# Patient Record
Sex: MALE | Race: WHITE | NOT HISPANIC OR LATINO
[De-identification: names, ages, dates, MRNs, and addresses within clinical notes are randomized per-mention and may not be internally consistent; named-entity substitution may affect disease eponyms.]

---

## 2021-11-18 PROBLEM — Z00.00 ENCOUNTER FOR PREVENTIVE HEALTH EXAMINATION: Status: ACTIVE | Noted: 2021-11-18

## 2021-11-29 ENCOUNTER — APPOINTMENT (OUTPATIENT)
Dept: CARDIOLOGY | Facility: CLINIC | Age: 66
End: 2021-11-29
Payer: MEDICARE

## 2021-11-29 VITALS
HEIGHT: 69 IN | BODY MASS INDEX: 28.14 KG/M2 | WEIGHT: 190 LBS | HEART RATE: 58 BPM | SYSTOLIC BLOOD PRESSURE: 128 MMHG | DIASTOLIC BLOOD PRESSURE: 86 MMHG

## 2021-11-29 DIAGNOSIS — Z87.891 PERSONAL HISTORY OF NICOTINE DEPENDENCE: ICD-10-CM

## 2021-11-29 DIAGNOSIS — Z78.9 OTHER SPECIFIED HEALTH STATUS: ICD-10-CM

## 2021-11-29 DIAGNOSIS — Z82.49 FAMILY HISTORY OF ISCHEMIC HEART DISEASE AND OTHER DISEASES OF THE CIRCULATORY SYSTEM: ICD-10-CM

## 2021-11-29 PROCEDURE — 93000 ELECTROCARDIOGRAM COMPLETE: CPT

## 2021-11-29 PROCEDURE — 99205 OFFICE O/P NEW HI 60 MIN: CPT

## 2021-12-30 ENCOUNTER — OUTPATIENT (OUTPATIENT)
Dept: OUTPATIENT SERVICES | Facility: HOSPITAL | Age: 66
LOS: 1 days | Discharge: HOME | End: 2021-12-30
Payer: MEDICARE

## 2021-12-30 ENCOUNTER — RESULT REVIEW (OUTPATIENT)
Age: 66
End: 2021-12-30

## 2021-12-30 VITALS
WEIGHT: 190.04 LBS | HEIGHT: 69 IN | TEMPERATURE: 98 F | SYSTOLIC BLOOD PRESSURE: 150 MMHG | HEART RATE: 64 BPM | DIASTOLIC BLOOD PRESSURE: 86 MMHG | OXYGEN SATURATION: 97 % | RESPIRATION RATE: 16 BRPM

## 2021-12-30 DIAGNOSIS — Z01.818 ENCOUNTER FOR OTHER PREPROCEDURAL EXAMINATION: ICD-10-CM

## 2021-12-30 DIAGNOSIS — I48.0 PAROXYSMAL ATRIAL FIBRILLATION: ICD-10-CM

## 2021-12-30 DIAGNOSIS — Z98.890 OTHER SPECIFIED POSTPROCEDURAL STATES: Chronic | ICD-10-CM

## 2021-12-30 DIAGNOSIS — Z98.52 VASECTOMY STATUS: Chronic | ICD-10-CM

## 2021-12-30 LAB
ALBUMIN SERPL ELPH-MCNC: 4.6 G/DL — SIGNIFICANT CHANGE UP (ref 3.5–5.2)
ALP SERPL-CCNC: 69 U/L — SIGNIFICANT CHANGE UP (ref 30–115)
ALT FLD-CCNC: 23 U/L — SIGNIFICANT CHANGE UP (ref 0–41)
ANION GAP SERPL CALC-SCNC: 15 MMOL/L — HIGH (ref 7–14)
APTT BLD: 37.6 SEC — SIGNIFICANT CHANGE UP (ref 27–39.2)
AST SERPL-CCNC: 20 U/L — SIGNIFICANT CHANGE UP (ref 0–41)
BILIRUB SERPL-MCNC: 0.8 MG/DL — SIGNIFICANT CHANGE UP (ref 0.2–1.2)
BUN SERPL-MCNC: 19 MG/DL — SIGNIFICANT CHANGE UP (ref 10–20)
CALCIUM SERPL-MCNC: 9.1 MG/DL — SIGNIFICANT CHANGE UP (ref 8.5–10.1)
CHLORIDE SERPL-SCNC: 106 MMOL/L — SIGNIFICANT CHANGE UP (ref 98–110)
CO2 SERPL-SCNC: 21 MMOL/L — SIGNIFICANT CHANGE UP (ref 17–32)
CREAT SERPL-MCNC: 1.1 MG/DL — SIGNIFICANT CHANGE UP (ref 0.7–1.5)
GLUCOSE SERPL-MCNC: 99 MG/DL — SIGNIFICANT CHANGE UP (ref 70–99)
INR BLD: 1.11 RATIO — SIGNIFICANT CHANGE UP (ref 0.65–1.3)
POTASSIUM SERPL-MCNC: 3.6 MMOL/L — SIGNIFICANT CHANGE UP (ref 3.5–5)
POTASSIUM SERPL-SCNC: 3.6 MMOL/L — SIGNIFICANT CHANGE UP (ref 3.5–5)
PROT SERPL-MCNC: 6.7 G/DL — SIGNIFICANT CHANGE UP (ref 6–8)
PROTHROM AB SERPL-ACNC: 12.7 SEC — SIGNIFICANT CHANGE UP (ref 9.95–12.87)
SODIUM SERPL-SCNC: 142 MMOL/L — SIGNIFICANT CHANGE UP (ref 135–146)

## 2021-12-30 PROCEDURE — 71046 X-RAY EXAM CHEST 2 VIEWS: CPT | Mod: 26

## 2021-12-30 PROCEDURE — 93010 ELECTROCARDIOGRAM REPORT: CPT

## 2021-12-30 RX ORDER — APIXABAN 2.5 MG/1
1 TABLET, FILM COATED ORAL
Qty: 0 | Refills: 0 | DISCHARGE

## 2021-12-30 RX ORDER — CANDESARTAN CILEXETIL AND HYDROCHLOROTHIAZIDE 32; 12.5 MG/1; MG/1
1 TABLET ORAL
Qty: 0 | Refills: 0 | DISCHARGE

## 2021-12-30 RX ORDER — METOPROLOL TARTRATE 50 MG
1 TABLET ORAL
Qty: 0 | Refills: 0 | DISCHARGE

## 2021-12-30 RX ORDER — CLOPIDOGREL BISULFATE 75 MG/1
1 TABLET, FILM COATED ORAL
Qty: 0 | Refills: 0 | DISCHARGE

## 2021-12-30 NOTE — H&P PST ADULT - NSICDXPASTMEDICALHX_GEN_ALL_CORE_FT
PAST MEDICAL HISTORY:  Afib     CAD (coronary artery disease)     HTN (hypertension)     Hypercholesteremia

## 2021-12-30 NOTE — H&P PST ADULT - HISTORY OF PRESENT ILLNESS
66 YR old male states was admitted to a hospital in [place] 10/2021 -for rapid afib- is scheduled for AF/ AFL ABALTION/ ALEXANDRA/ CRYO. Denies COVID S/S. Recd vaccine. Verbalized understanding of COVID prevention  measures. Exercise guillermo 2FOS walks 3miles -40mts -3-4 times/week.  Anesthesia Alert  YES--Difficult Airway  NO--History of neck surgery or radiation  NO--Limited ROM of neck  NO--History of Malignant hyperthermia  NO--Personal or family history of Pseudocholinesterase deficiency  NO--Prior Anesthesia Complication  NO--Latex Allergy  NO--Loose teeth  NO--History of Rheumatoid Arthritis  NO--SOMMER  No Bleeding risk  NO--Other_____

## 2021-12-30 NOTE — H&P PST ADULT - NSANTHOSAYNRD_GEN_A_CORE
No. SOMMER screening performed.  STOP BANG Legend: 0-2 = LOW Risk; 3-4 = INTERMEDIATE Risk; 5-8 = HIGH Risk

## 2021-12-30 NOTE — H&P PST ADULT - NSICDXPASTSURGICALHX_GEN_ALL_CORE_FT
PAST SURGICAL HISTORY:  H/O vasectomy RT knee arthroscopy    History of left heart catheterization (LHC) stent-7/2021

## 2022-01-08 NOTE — CARDIOLOGY SUMMARY
[de-identified] : (11/29/2021) / ECG: sinus bradycardia at 58 bpm, no significant ST/T abnormalities [de-identified] : (10/02/2021) / TTE: EF 62%, mild AI, moderate MR [de-identified] : (7/7/2021) / Cath PCI report: LAD 50%, Cx 50%, mid RCA 80%, 4.5x 16 mm synergy stent

## 2022-01-08 NOTE — HISTORY OF PRESENT ILLNESS
[FreeTextEntry1] : Referring Cardiologist: Dr. Emily Arrieta\par Referring Internist: Dr. Sabi Pierre\par \par CAD s/p stent in July 2021; hypertension, hyperlipidemia\par Atrial Fibrillation 10/28/2021 while in [place]\par He woke up with palpitations and SOB; called ambulance; started on Amiodarone and Eliquis;\par EMS original assessment was SVT; on arrival to ER he was in AF.\par He is physically active: biking outside; treadmill; light weights\par 1.5 hours 4 days a week\par He has no chest pain, no shortness of breath, no dyspnea on exertion, no orthopnea, no PND. He denies dizziness, lightheadedness and syncope. He has no exertional symptoms. He presents for evaluation.\par \par

## 2022-01-08 NOTE — DISCUSSION/SUMMARY
[FreeTextEntry1] : Mr. Russ Schultz is a pleasant 66 year-old man with CAD s/p stent in July 2021, hypertension, hyperlipidemia, and cardiac arrhythmias. He was diagnosed with Atrial Fibrillation on 10/28/2021 while in [place]. He woke up with palpitations and SOB; called ambulance; give Amiodarone and Eliquis;\par EMS original assessment was SVT; on arrival to ER he was in AF. Review of ECGs shows Atrial Flutter/Atrial Fibrillation. He is physically active: biking outside; treadmill; light weights -1.5 hours 4 days a week- His CHADSVASC score is 3 (Age, CAD, HTN). He is on Eliquis. \par \par I recommend to continue Eliquis, and Metoprolol. \par \par Use of betablocker/CCB/antiarrhythmics limited by resting bradycardia. \par \par I discussed hydration and avoiding ETOH.\par \par I recommend evaluation for SOMMER.\par \par The management strategies for atrial fibrillation were discussed focusing on the issues of stroke prevention, heart rate control and rhythm control. The options of rate control, antiarrhythmic drug therapy, and electrophysiologic testing and catheter ablation therapy were discussed at length. As he is not keen on long term antiarrhythmic medication, he is a good candidate for catheter ablation of atrial fibrillation in the form of pulmonary vein isolation. I have discussed the procedure with him in great detail. He was told this procedure is performed under anesthesia with the duration of about four hours. Possible complications include but not limited to bleeding, vascular injury, groin complications, cardiac tamponade, stroke, esophageal injury, pulmonary vein stenosis, need for pacemaker, need for cardiac surgery, and rare risks of esophageal fistula/stroke/heart attack/death. Intracardiac echo is used to monitor the procedure. In addition, we use a temperature probe in the esophagus to prevent lesions. The success rate is in the range 70-80%. About 20% of patients require a second procedure for pulmonary vein reconnection. \par \par Patient expressed he will contact my office if he wants to proceed with ablation. If he agrees, I will plan CryoPVI, RF-CTI, and SVT induction. \par \par He verbalized understanding of the discussion and all questions were addressed and answered. Patient will follow with me in 2 months’ time. Please do not hesitate to contact me at [phone] if you have any further questions regarding this patient care.\par \par

## 2022-01-08 NOTE — REASON FOR VISIT
[Arrhythmia/ECG Abnorrmalities] : arrhythmia/ECG abnormalities [FreeTextEntry3] : Dr. Emily Arrieta - Dr. Sabi Pierre

## 2022-01-09 ENCOUNTER — LABORATORY RESULT (OUTPATIENT)
Age: 67
End: 2022-01-09

## 2022-01-12 ENCOUNTER — INPATIENT (INPATIENT)
Facility: HOSPITAL | Age: 67
LOS: 0 days | Discharge: HOME | End: 2022-01-13
Attending: STUDENT IN AN ORGANIZED HEALTH CARE EDUCATION/TRAINING PROGRAM | Admitting: STUDENT IN AN ORGANIZED HEALTH CARE EDUCATION/TRAINING PROGRAM
Payer: MEDICARE

## 2022-01-12 VITALS
HEART RATE: 59 BPM | OXYGEN SATURATION: 99 % | SYSTOLIC BLOOD PRESSURE: 140 MMHG | HEIGHT: 69 IN | TEMPERATURE: 98 F | WEIGHT: 190.04 LBS | DIASTOLIC BLOOD PRESSURE: 86 MMHG | RESPIRATION RATE: 16 BRPM

## 2022-01-12 DIAGNOSIS — Z98.890 OTHER SPECIFIED POSTPROCEDURAL STATES: Chronic | ICD-10-CM

## 2022-01-12 DIAGNOSIS — I48.0 PAROXYSMAL ATRIAL FIBRILLATION: ICD-10-CM

## 2022-01-12 DIAGNOSIS — Z98.52 VASECTOMY STATUS: Chronic | ICD-10-CM

## 2022-01-12 LAB
ALBUMIN SERPL ELPH-MCNC: 4 G/DL — SIGNIFICANT CHANGE UP (ref 3.5–5.2)
ALP SERPL-CCNC: 51 U/L — SIGNIFICANT CHANGE UP (ref 30–115)
ALT FLD-CCNC: 15 U/L — SIGNIFICANT CHANGE UP (ref 0–41)
ANION GAP SERPL CALC-SCNC: 12 MMOL/L — SIGNIFICANT CHANGE UP (ref 7–14)
AST SERPL-CCNC: 16 U/L — SIGNIFICANT CHANGE UP (ref 0–41)
BILIRUB SERPL-MCNC: 0.9 MG/DL — SIGNIFICANT CHANGE UP (ref 0.2–1.2)
BLD GP AB SCN SERPL QL: SIGNIFICANT CHANGE UP
BUN SERPL-MCNC: 17 MG/DL — SIGNIFICANT CHANGE UP (ref 10–20)
CALCIUM SERPL-MCNC: 8.3 MG/DL — LOW (ref 8.5–10.1)
CHLORIDE SERPL-SCNC: 107 MMOL/L — SIGNIFICANT CHANGE UP (ref 98–110)
CK MB CFR SERPL CALC: 2.3 NG/ML — SIGNIFICANT CHANGE UP (ref 0.6–6.3)
CK SERPL-CCNC: 89 U/L — SIGNIFICANT CHANGE UP (ref 0–225)
CO2 SERPL-SCNC: 22 MMOL/L — SIGNIFICANT CHANGE UP (ref 17–32)
CREAT SERPL-MCNC: 1.1 MG/DL — SIGNIFICANT CHANGE UP (ref 0.7–1.5)
GLUCOSE SERPL-MCNC: 121 MG/DL — HIGH (ref 70–99)
HCT VFR BLD CALC: 37.4 % — LOW (ref 42–52)
HCT VFR BLD CALC: 41.9 % — LOW (ref 42–52)
HGB BLD-MCNC: 13.1 G/DL — LOW (ref 14–18)
HGB BLD-MCNC: 14.6 G/DL — SIGNIFICANT CHANGE UP (ref 14–18)
INR BLD: 1.15 RATIO — SIGNIFICANT CHANGE UP (ref 0.65–1.3)
MAGNESIUM SERPL-MCNC: 1.8 MG/DL — SIGNIFICANT CHANGE UP (ref 1.8–2.4)
MCHC RBC-ENTMCNC: 30.1 PG — SIGNIFICANT CHANGE UP (ref 27–31)
MCHC RBC-ENTMCNC: 30.2 PG — SIGNIFICANT CHANGE UP (ref 27–31)
MCHC RBC-ENTMCNC: 34.8 G/DL — SIGNIFICANT CHANGE UP (ref 32–37)
MCHC RBC-ENTMCNC: 35 G/DL — SIGNIFICANT CHANGE UP (ref 32–37)
MCV RBC AUTO: 86.2 FL — SIGNIFICANT CHANGE UP (ref 80–94)
MCV RBC AUTO: 86.4 FL — SIGNIFICANT CHANGE UP (ref 80–94)
NRBC # BLD: 0 /100 WBCS — SIGNIFICANT CHANGE UP (ref 0–0)
NRBC # BLD: 0 /100 WBCS — SIGNIFICANT CHANGE UP (ref 0–0)
NT-PROBNP SERPL-SCNC: 54 PG/ML — SIGNIFICANT CHANGE UP (ref 0–300)
PLATELET # BLD AUTO: 150 K/UL — SIGNIFICANT CHANGE UP (ref 130–400)
PLATELET # BLD AUTO: 165 K/UL — SIGNIFICANT CHANGE UP (ref 130–400)
POTASSIUM SERPL-MCNC: 3.7 MMOL/L — SIGNIFICANT CHANGE UP (ref 3.5–5)
POTASSIUM SERPL-SCNC: 3.7 MMOL/L — SIGNIFICANT CHANGE UP (ref 3.5–5)
PROT SERPL-MCNC: 5.7 G/DL — LOW (ref 6–8)
PROTHROM AB SERPL-ACNC: 13.2 SEC — HIGH (ref 9.95–12.87)
RBC # BLD: 4.34 M/UL — LOW (ref 4.7–6.1)
RBC # BLD: 4.85 M/UL — SIGNIFICANT CHANGE UP (ref 4.7–6.1)
RBC # FLD: 12.1 % — SIGNIFICANT CHANGE UP (ref 11.5–14.5)
RBC # FLD: 12.2 % — SIGNIFICANT CHANGE UP (ref 11.5–14.5)
SODIUM SERPL-SCNC: 141 MMOL/L — SIGNIFICANT CHANGE UP (ref 135–146)
TROPONIN T SERPL-MCNC: <0.01 NG/ML — SIGNIFICANT CHANGE UP
WBC # BLD: 5.21 K/UL — SIGNIFICANT CHANGE UP (ref 4.8–10.8)
WBC # BLD: 5.71 K/UL — SIGNIFICANT CHANGE UP (ref 4.8–10.8)
WBC # FLD AUTO: 5.21 K/UL — SIGNIFICANT CHANGE UP (ref 4.8–10.8)
WBC # FLD AUTO: 5.71 K/UL — SIGNIFICANT CHANGE UP (ref 4.8–10.8)

## 2022-01-12 PROCEDURE — 93306 TTE W/DOPPLER COMPLETE: CPT | Mod: 26

## 2022-01-12 PROCEDURE — 93312 ECHO TRANSESOPHAGEAL: CPT | Mod: 26

## 2022-01-12 PROCEDURE — 93620 COMP EP EVL R AT VEN PAC&REC: CPT | Mod: 26

## 2022-01-12 PROCEDURE — 93621 COMP EP EVL L PAC&REC C SINS: CPT | Mod: 26

## 2022-01-12 PROCEDURE — 93325 DOPPLER ECHO COLOR FLOW MAPG: CPT | Mod: 26

## 2022-01-12 PROCEDURE — 93320 DOPPLER ECHO COMPLETE: CPT | Mod: 26

## 2022-01-12 PROCEDURE — 93623 PRGRMD STIMJ&PACG IV RX NFS: CPT | Mod: 26

## 2022-01-12 RX ORDER — ATORVASTATIN CALCIUM 80 MG/1
40 TABLET, FILM COATED ORAL AT BEDTIME
Refills: 0 | Status: DISCONTINUED | OUTPATIENT
Start: 2022-01-12 | End: 2022-01-13

## 2022-01-12 RX ORDER — PANTOPRAZOLE SODIUM 20 MG/1
40 TABLET, DELAYED RELEASE ORAL DAILY
Refills: 0 | Status: DISCONTINUED | OUTPATIENT
Start: 2022-01-12 | End: 2022-01-12

## 2022-01-12 RX ORDER — SODIUM CHLORIDE 9 MG/ML
1000 INJECTION INTRAMUSCULAR; INTRAVENOUS; SUBCUTANEOUS
Refills: 0 | Status: DISCONTINUED | OUTPATIENT
Start: 2022-01-12 | End: 2022-01-13

## 2022-01-12 RX ORDER — CLOPIDOGREL BISULFATE 75 MG/1
75 TABLET, FILM COATED ORAL DAILY
Refills: 0 | Status: DISCONTINUED | OUTPATIENT
Start: 2022-01-12 | End: 2022-01-13

## 2022-01-12 RX ORDER — INFLUENZA VIRUS VACCINE 15; 15; 15; 15 UG/.5ML; UG/.5ML; UG/.5ML; UG/.5ML
0.7 SUSPENSION INTRAMUSCULAR ONCE
Refills: 0 | Status: DISCONTINUED | OUTPATIENT
Start: 2022-01-12 | End: 2022-01-13

## 2022-01-12 RX ORDER — METOPROLOL TARTRATE 50 MG
25 TABLET ORAL DAILY
Refills: 0 | Status: DISCONTINUED | OUTPATIENT
Start: 2022-01-12 | End: 2022-01-13

## 2022-01-12 RX ORDER — APIXABAN 2.5 MG/1
5 TABLET, FILM COATED ORAL
Refills: 0 | Status: DISCONTINUED | OUTPATIENT
Start: 2022-01-12 | End: 2022-01-12

## 2022-01-12 RX ORDER — HYDROCHLOROTHIAZIDE 25 MG
12.5 TABLET ORAL DAILY
Refills: 0 | Status: DISCONTINUED | OUTPATIENT
Start: 2022-01-12 | End: 2022-01-13

## 2022-01-12 NOTE — CONSULT NOTE ADULT - ASSESSMENT
Acute coronary syndrome during EP study  - given [plavix today  - continue statin  - will perform cardiac catheterization this afternoon to rule out progression of CAD

## 2022-01-12 NOTE — CHART NOTE - NSCHARTNOTEFT_GEN_A_CORE
I saw and examined patient and I reviewed his chart and blood work. I attest that there has been no clinical change in patient's condition since last assessment documented in H&P, consult, or last office visit.
PRE-OP DIAGNOSIS: ST elevation during EP study after isopryl infusion    PROCEDURE:   [x] Coronary Angiogram     [x] LHC     [] LVG     [] RHC     [x] Intervention (see below)         PHYSICIAN: Dr. Gibson    FELLOW: Dr. Martinez, Dr. Reddy, Dr. Garcia    PROCEDURE DESCRIPTION:     Consent:      [X] Patient     [] Family Member     []  Used        Anesthesia:     [] General     [X] Sedation     [] Local        Access & Closure:     [x] 6Fr Radial Artery --> D-STAT    [] Fr Femoral Artery     [] Fr Femoral Vein     [] Fr Brachial Vein       IV Contrast: 50       Intervention: iFR  LAD 0.92  D1 0.94  D2 0.93    FINDINGS:   Coronary Dominance: right    LM: mild luminal irregularities    LAD: prox LAD 50% stenosis discrete, iFR 0.92   Diag: D1 60% stenosis iFR 0.94  D2 70% prox lesion iFR 0.93    CX: mild to moderate diffuse atherosclerotic disease   OM1: small vessel, mild disease  OM2: large sized, mild luminal irregularities    RCA: patent prox stent   RPDA: no disease    LVEDP: mildly elevated     ESTIMATED BLOOD LOSS: < 10 mL     CONDITION:     [X] Good     [] Fair     [] Critical      POST-OP DIAGNOSIS:      [x] Non-obstructive CAD     PLAN OF CARE:     [x] Return to In-patient bed    [x] Aggressive medical management    [x] IV Fluids: NS at 100cc/h x 6h    Derrell Garcia PGY4
Electrophysiology Brief Post-Op Note      I have personally seen and examined the patient.  I agree with the history and physical which I have reviewed and noted any changes below.     PRE-OP DIAGNOSIS: SVT-Atrial Flutter-Atrial Fibrillation    POST-OP DIAGNOSIS: SVT-Atrial Flutter-Atrial Fibrillation    PROCEDURE:  -Electrophysiology study with induction of arrhythmias  -Infusion of medicine  -ALEXANDRA    Vascular Access used (using Ultrasound Guidance and micropuncture needle)  -Right Femoral Vein: 8F  5F  -Left Femoral Vein: 11F 8F 6F  -Right Femoral Artery: none    All sheaths and wires removed, Perclose sutures applied to 11 and 8, and manual pressure applied.    Physician: Galina  Assistant: None    ANESTHESIA TYPE:  [X]General Anesthesia  [  ] Sedation  [x] Local/Regional    CONDITION  [  ] Critical  [  ] Serious  [  ]Fair  [X]Good    SPECIMENS REMOVED (IF APPLICABLE): NONE  All wires were removed    IMPLANTS (IF APPLICABLE): NONE    FINDINGS (see below)  -NO inducible SVT  -No immediate complications  -ESTIMATED BLOOD LOSS:  15 mL  -ST segment depression noted in inferior and lateral leads during Programmed Atrial Stimulation, on Isuprel 3 mcg; Isuprel stopped  ST segments normalized within 3 minutes;   -Procedure stopped - Interventional team called - Dr. Gibson - Case discussed with Dr. Arrieta over phone.   -All sheaths removed - general anesthesia reversed - patient had no symptoms - St segments remained normal  -Plan for transfer to cath lab for coronary angiogram  -I called daughter Lissett twice and updated her  -I called Dr. Nayak and updated her      PLAN OF CARE  -	CCU   -             Cardiac Cath today   -	Bed rest for 4 hours  -             Was on Plavix/Eliquis; Antiplatelets/DOACs will depend on coronary intervention

## 2022-01-12 NOTE — PACU DISCHARGE NOTE - COMMENTS
S/P ALEXANDRA   EP STUDY  ABORTED ABLATION BY CARDIOLOGIST SECONDARY TO TEMPORARY ST DEPRESSION DURING THE STUDY WHICH RESOLVED IMMEDIATELY ONCE INDUCED TACHYCARDIA STOPPED ,DR MEREDITH INFORMED IMMEDIATELY ONCE ST DEPRESSION NOTED .AFTER DISCUSSIONS W CARDIOLOGIST ,CASE ABORTED FOR CARDIAC CATH TO BE PERFORMED .  PT STABLE THROUGHOUT THE PROCEDURE

## 2022-01-12 NOTE — CONSULT NOTE ADULT - SUBJECTIVE AND OBJECTIVE BOX
Date of Admission: 1/12/22    CHIEF COMPLAINT: EP study    HISTORY OF PRESENT ILLNESS: 66yMale with PMH below presented to the hospital for an EP study for SVT but had St changes consistent with ischemia during and after isopryl infusion. STs went down, patient take off the table and was referred for coronary angiography. Patient has a history of CAD with recent PCI in July 2021 with Dr. Arrieta. Patient intubated and sedated so collateral information unavailable.     PAST MEDICAL & SURGICAL HISTORY:  HTN (hypertension)    Afib    Hypercholesteremia    CAD (coronary artery disease)    History of left heart catheterization (LHC)  stent-7/2021    H/O vasectomy  RT knee arthroscopy        FAMILY HISTORY:  [ ] no pertinent family history of premature cardiovascular disease in first degree relatives.  Mother:   Father:   Siblings:     SOCIAL HISTORY:    [ ] Non-smoker  [ ] Smoker  [ ] Alcohol    Allergies    No Known Allergies    Intolerances    	    REVIEW OF SYSTEMS:  unable to obtain due to patient condition    PHYSICAL EXAM:  T(C): 36.7 (01-12-22 @ 07:55), Max: 36.7 (01-12-22 @ 07:55)  HR: 59 (01-12-22 @ 07:55) (59 - 59)  BP: 140/86 (01-12-22 @ 07:55) (140/86 - 140/86)  RR: 16 (01-12-22 @ 07:55) (16 - 16)  SpO2: 99% (01-12-22 @ 07:55) (99% - 99%)  Wt(kg): --  I&O's Summary      General Appearance: well appearing, normal for age and gender. 	  Neck: normal JVP, no bruit.   Eyes: No xanthomalasia, Extra Ocular muscles intact.   Cardiovascular: regular rate and rhythm S1 S2, No JVD, No murmurs, No edema  Respiratory: Lungs clear to auscultation	  Psychiatry: Alert and oriented x 3, Mood & affect appropriate  Gastrointestinal:  Soft, Non-tender  Skin/Integumen: No rashes, No ecchymoses, No cyanosis	  Neurologic: Non-focal  Musculoskeletal/ extremities: Normal range of motion, No clubbing, cyanosis or edema  Vascular: Peripheral pulses palpable 2+ bilaterally    LABS:	 	                          13.1   5.21  )-----------( 165      ( 12 Jan 2022 11:27 )             37.4     01-12    141  |  107  |  17  ----------------------------<  121<H>  3.7   |  22  |  1.1    Ca    8.3<L>      12 Jan 2022 11:27  Mg     1.8     01-12    TPro  5.7<L>  /  Alb  4.0  /  TBili  0.9  /  DBili  x   /  AST  16  /  ALT  15  /  AlkPhos  51  01-12    CARDIAC MARKERS ( 12 Jan 2022 11:27 )  x     / <0.01 ng/mL / 89 U/L / x     / 2.3 ng/mL      PT/INR - ( 12 Jan 2022 11:27 )   PT: 13.20 sec;   INR: 1.15 ratio             CARDIAC MARKERS:            TELEMETRY EVENTS: 	    ECG: LAN in AVR ST depressions in all other leads.  	  RADIOLOGY:  OTHER: 	    PREVIOUS DIAGNOSTIC TESTING:    [ ] Echocardiogram:  [ ]  Catheterization:  [ ] Stress Test:  	  	    Home Medications:  atorvastatin 40 mg oral tablet: 1 tab(s) orally once a day (30 Dec 2021 14:24)  candesartan-hydrochlorothiazide 16 mg-12.5 mg oral tablet: 1 tab(s) orally once a day (30 Dec 2021 14:24)  clopidogrel 75 mg oral tablet: 1 tab(s) orally once a day (30 Dec 2021 14:24)  Eliquis 5 mg oral tablet: 1 tab(s) orally 2 times a day (30 Dec 2021 14:24)  metoprolol succinate 25 mg oral tablet, extended release: 1 tab(s) orally once a day (30 Dec 2021 14:24)    MEDICATIONS  (STANDING):  atorvastatin 40 milliGRAM(s) Oral at bedtime  clopidogrel Tablet 75 milliGRAM(s) Oral daily  hydrochlorothiazide 12.5 milliGRAM(s) Oral daily  metoprolol succinate ER 25 milliGRAM(s) Oral daily    MEDICATIONS  (PRN):

## 2022-01-13 ENCOUNTER — TRANSCRIPTION ENCOUNTER (OUTPATIENT)
Age: 67
End: 2022-01-13

## 2022-01-13 VITALS
RESPIRATION RATE: 16 BRPM | SYSTOLIC BLOOD PRESSURE: 128 MMHG | OXYGEN SATURATION: 97 % | HEART RATE: 68 BPM | DIASTOLIC BLOOD PRESSURE: 69 MMHG | TEMPERATURE: 98 F

## 2022-01-13 PROBLEM — I10 ESSENTIAL (PRIMARY) HYPERTENSION: Chronic | Status: ACTIVE | Noted: 2021-12-30

## 2022-01-13 PROBLEM — I25.10 ATHEROSCLEROTIC HEART DISEASE OF NATIVE CORONARY ARTERY WITHOUT ANGINA PECTORIS: Chronic | Status: ACTIVE | Noted: 2021-12-30

## 2022-01-13 PROBLEM — E78.00 PURE HYPERCHOLESTEROLEMIA, UNSPECIFIED: Chronic | Status: ACTIVE | Noted: 2021-12-30

## 2022-01-13 PROBLEM — I48.91 UNSPECIFIED ATRIAL FIBRILLATION: Chronic | Status: ACTIVE | Noted: 2021-12-30

## 2022-01-13 PROCEDURE — 99238 HOSP IP/OBS DSCHRG MGMT 30/<: CPT

## 2022-01-13 PROCEDURE — 99232 SBSQ HOSP IP/OBS MODERATE 35: CPT

## 2022-01-13 PROCEDURE — 93010 ELECTROCARDIOGRAM REPORT: CPT

## 2022-01-13 RX ORDER — ATORVASTATIN CALCIUM 80 MG/1
1 TABLET, FILM COATED ORAL
Qty: 0 | Refills: 0 | DISCHARGE

## 2022-01-13 RX ORDER — ATORVASTATIN CALCIUM 80 MG/1
1 TABLET, FILM COATED ORAL
Qty: 30 | Refills: 0
Start: 2022-01-13 | End: 2022-02-11

## 2022-01-13 RX ORDER — ATORVASTATIN CALCIUM 80 MG/1
2 TABLET, FILM COATED ORAL
Qty: 0 | Refills: 0 | DISCHARGE
Start: 2022-01-13

## 2022-01-13 RX ADMIN — ATORVASTATIN CALCIUM 40 MILLIGRAM(S): 80 TABLET, FILM COATED ORAL at 00:46

## 2022-01-13 RX ADMIN — Medication 25 MILLIGRAM(S): at 05:20

## 2022-01-13 RX ADMIN — Medication 12.5 MILLIGRAM(S): at 05:20

## 2022-01-13 NOTE — DISCHARGE NOTE PROVIDER - NSDCFUADDINST_GEN_ALL_CORE_FT
- Procedure rescheduled for next Thursday 1/20 in EP lab  - Office will be in contact for repeat COVID test prior to procedure  - Continue all current medications  - No heavy lifting for one week

## 2022-01-13 NOTE — DISCHARGE NOTE PROVIDER - CARE PROVIDER_API CALL
Alex Mojica (MD)  Cardiac Electrophysiology; Cardiovascular Disease; [facility] Medicine  [address] [place] [address]  Phone: [phone]  Fax: [phone]  Follow Up Time: 1 week

## 2022-01-13 NOTE — PROGRESS NOTE ADULT - SUBJECTIVE AND OBJECTIVE BOX
INTERVAL HPI/OVERNIGHT EVENTS: No acute tele events overnight    MEDICATIONS  (STANDING):  atorvastatin 40 milliGRAM(s) Oral at bedtime  clopidogrel Tablet 75 milliGRAM(s) Oral daily  hydrochlorothiazide 12.5 milliGRAM(s) Oral daily  influenza  Vaccine (HIGH DOSE) 0.7 milliLiter(s) IntraMuscular once  metoprolol succinate ER 25 milliGRAM(s) Oral daily  sodium chloride 0.9%. 1000 milliLiter(s) (100 mL/Hr) IV Continuous <Continuous>    MEDICATIONS  (PRN):      Allergies  No Known Allergies    Intolerances        REVIEW OF SYSTEMS: No CP, palpitations, dizziness or SOB    Vital Signs Last 24 Hrs  T(C): 36.5 (13 Jan 2022 08:00), Max: 36.5 (13 Jan 2022 08:00)  T(F): 97.7 (13 Jan 2022 08:00), Max: 97.7 (13 Jan 2022 08:00)  HR: 68 (13 Jan 2022 08:00) (60 - 90)  BP: 128/69 (13 Jan 2022 08:00) (87/52 - 128/69)  BP(mean): 92 (13 Jan 2022 08:00) (64 - 92)  RR: 16 (13 Jan 2022 08:00) (7 - 40)  SpO2: 97% (13 Jan 2022 08:00) (93% - 99%)    01-13-22 @ 07:01  -  01-13-22 @ 09:38  --------------------------------------------------------  IN: 240 mL / OUT: 1 mL / NET: 239 mL        Physical Exam  GENERAL: In no apparent distress, well nourished, and hydrated.  EYES: EOMI, PERRLA, conjunctiva and sclera clear  ENMT: No tonsillar erythema, exudates, or enlargements; ist mucous membranes, Good dentition, No lesions  NECK: Supple   HEART: Regular rate and rhythm; No murmurs, rubs, or gallops.  PULMONARY: Clear to auscultation and perfusion.  No rales, wheezing, or rhonchi bilaterally.  ABDOMEN: Soft, Nontender, Nondistended; Bowel sounds present  EXTREMITIES:  2+ Peripheral Pulses, No clubbing, cyanosis, or edema  SKIN: Groins healing well BL, no hematoma  NEUROLOGICAL: Grossly nonfocal    LABS:                        13.1   5.21  )-----------( 165      ( 12 Jan 2022 11:27 )             37.4     01-12    141  |  107  |  17  ----------------------------<  121<H>  3.7   |  22  |  1.1    Ca    8.3<L>      12 Jan 2022 11:27  Mg     1.8     01-12    TPro  5.7<L>  /  Alb  4.0  /  TBili  0.9  /  DBili  x   /  AST  16  /  ALT  15  /  AlkPhos  51  01-12    PT/INR - ( 12 Jan 2022 11:27 )   PT: 13.20 sec;   INR: 1.15 ratio               RADIOLOGY & ADDITIONAL TESTS:

## 2022-01-13 NOTE — PROGRESS NOTE ADULT - ASSESSMENT
Assessment: 65 yo M with hx of HTN, HLD CAD and PAF admitted for EP study with possible SVT ablation. Procedure complicated by ST changes consistent with ischemia during and after isopryl infusion. STs went down, patient take off the table and was referred for coronary angiography. Patient has a history of CAD with recent PCI in July 2021 with Dr. Arrieta. Patient taken to cath lab with nononbstructive CAD. POD#1 and feeling well today    Impression:  PAF Assessment: 67 yo M with hx of HTN, HLD CAD and PAF admitted for EP study with possible SVT ablation. Procedure complicated by ST changes consistent with ischemia during and after isopryl infusion. STs went down, patient take off the table and was referred for coronary angiography. Patient has a history of CAD with recent PCI in July 2021 with Dr. Arrieta. Patient taken to cath lab with nononbstructive CAD. POD#1 and feeling well today    Impression:  S/P EP Study to R/O SVT  PAF  ST Changes during isopryl  Cath (-)  HTN  HLD  CAD sp PCI    Plan:  - Ablation aborted 2/2 ST changes  - Will reschedule patient for next week 1/20 for ablation as outpatient  - Continue all current medications  - No heavy lifting for one week

## 2022-01-13 NOTE — DISCHARGE NOTE PROVIDER - NSDCFUSCHEDAPPT_GEN_ALL_CORE_FT
TORIBIO YANCEY ; 03/28/2022 ; [facility] [address] [facility] TORIBIO YANCEY ; 02/28/2022 ; [facility] [address] [facility] TORIBIO Mon ; 03/28/2022 ; [facility] [address]

## 2022-01-13 NOTE — DISCHARGE NOTE NURSING/CASE MANAGEMENT/SOCIAL WORK - PATIENT PORTAL LINK FT
You can access the FollowMyHealth Patient Portal offered by [facility] by registering at the following website: [email or web address]. By joining [email or web address]’s FollowMyHealth portal, you will also be able to view your health information using other applications (apps) compatible with our system.

## 2022-01-13 NOTE — DISCHARGE NOTE PROVIDER - NSDCMRMEDTOKEN_GEN_ALL_CORE_FT
atorvastatin 40 mg oral tablet: 1 tab(s) orally once a day  candesartan-hydrochlorothiazide 16 mg-12.5 mg oral tablet: 1 tab(s) orally once a day  clopidogrel 75 mg oral tablet: 1 tab(s) orally once a day  Eliquis 5 mg oral tablet: 1 tab(s) orally 2 times a day  metoprolol succinate 25 mg oral tablet, extended release: 1 tab(s) orally once a day   candesartan-hydrochlorothiazide 16 mg-12.5 mg oral tablet: 1 tab(s) orally once a day  clopidogrel 75 mg oral tablet: 1 tab(s) orally once a day  Eliquis 5 mg oral tablet: 1 tab(s) orally 2 times a day  Lipitor 40 mg oral tablet: 2 tab(s) orally once a day (at bedtime)  metoprolol succinate 25 mg oral tablet, extended release: 1 tab(s) orally once a day

## 2022-01-13 NOTE — DISCHARGE NOTE PROVIDER - HOSPITAL COURSE
67 yo M with hx of HTN, HLD CAD and PAF admitted for EP study with possible SVT ablation. Procedure complicated by ST changes consistent with ischemia during and after isopryl infusion. STs went down, patient take off the table and was referred for coronary angiography. Patient has a history of CAD with recent PCI in July 2021 with Dr. Arrieta. Patient taken to cath lab with nononbstructive CAD. POD#1 and feeling well today 67 yo M with hx of HTN, HLD CAD with PCI 7/2021 ([facility], Dr. Arrieta) and PAF admitted for EP study with possible SVT ablation. EP procedure complicated by ST changes consistent with ischemia during and after isopryl infusion. STs went down, however procedure was aborted and patient was referred for coronary angiography. Patient taken to cath lab and found to have nononbstructive CAD. EKG changes during EP procedure thought to be due to microvascular disease.    POD#1 and feeling well today, stable for discharge home. He will return next week for the ablation.     Plan:  - Will reschedule patient for next week 1/20 for ablation as outpatient  - Continue all current medications  - No heavy lifting for one week  - Follow-up with general cardiologist to discuss aggressive risk factor modification

## 2022-01-13 NOTE — DISCHARGE NOTE NURSING/CASE MANAGEMENT/SOCIAL WORK - NSDCPEFALRISK_GEN_ALL_CORE
For information on Fall & Injury Prevention, visit: [email or web address] OR  [email or web address] OR  https://www.cdc.gov/steadi/patient.html

## 2022-01-15 ENCOUNTER — LABORATORY RESULT (OUTPATIENT)
Age: 67
End: 2022-01-15

## 2022-01-18 ENCOUNTER — INPATIENT (INPATIENT)
Facility: HOSPITAL | Age: 67
LOS: 0 days | Discharge: HOME | End: 2022-01-18
Attending: STUDENT IN AN ORGANIZED HEALTH CARE EDUCATION/TRAINING PROGRAM | Admitting: STUDENT IN AN ORGANIZED HEALTH CARE EDUCATION/TRAINING PROGRAM
Payer: MEDICARE

## 2022-01-18 ENCOUNTER — TRANSCRIPTION ENCOUNTER (OUTPATIENT)
Age: 67
End: 2022-01-18

## 2022-01-18 DIAGNOSIS — I10 ESSENTIAL (PRIMARY) HYPERTENSION: ICD-10-CM

## 2022-01-18 DIAGNOSIS — Z87.891 PERSONAL HISTORY OF NICOTINE DEPENDENCE: ICD-10-CM

## 2022-01-18 DIAGNOSIS — I48.92 UNSPECIFIED ATRIAL FLUTTER: ICD-10-CM

## 2022-01-18 DIAGNOSIS — Z98.52 VASECTOMY STATUS: Chronic | ICD-10-CM

## 2022-01-18 DIAGNOSIS — Z95.5 PRESENCE OF CORONARY ANGIOPLASTY IMPLANT AND GRAFT: ICD-10-CM

## 2022-01-18 DIAGNOSIS — Z79.01 LONG TERM (CURRENT) USE OF ANTICOAGULANTS: ICD-10-CM

## 2022-01-18 DIAGNOSIS — I48.0 PAROXYSMAL ATRIAL FIBRILLATION: ICD-10-CM

## 2022-01-18 DIAGNOSIS — Z98.890 OTHER SPECIFIED POSTPROCEDURAL STATES: Chronic | ICD-10-CM

## 2022-01-18 DIAGNOSIS — Z79.02 LONG TERM (CURRENT) USE OF ANTITHROMBOTICS/ANTIPLATELETS: ICD-10-CM

## 2022-01-18 DIAGNOSIS — Z98.52 VASECTOMY STATUS: ICD-10-CM

## 2022-01-18 DIAGNOSIS — I25.10 ATHEROSCLEROTIC HEART DISEASE OF NATIVE CORONARY ARTERY WITHOUT ANGINA PECTORIS: ICD-10-CM

## 2022-01-18 PROCEDURE — 93306 TTE W/DOPPLER COMPLETE: CPT | Mod: 26

## 2022-01-18 PROCEDURE — 93655 ICAR CATH ABLTJ DSCRT ARRHYT: CPT

## 2022-01-18 PROCEDURE — 93656 COMPRE EP EVAL ABLTJ ATR FIB: CPT

## 2022-01-18 PROCEDURE — 93613 INTRACARDIAC EPHYS 3D MAPG: CPT

## 2022-01-18 PROCEDURE — 93662 INTRACARDIAC ECG (ICE): CPT | Mod: 26

## 2022-01-18 PROCEDURE — 93623 PRGRMD STIMJ&PACG IV RX NFS: CPT | Mod: 26

## 2022-01-18 RX ORDER — FAMOTIDINE 10 MG/ML
20 INJECTION INTRAVENOUS EVERY 12 HOURS
Refills: 0 | Status: DISCONTINUED | OUTPATIENT
Start: 2022-01-18 | End: 2022-01-18

## 2022-01-18 RX ORDER — ATORVASTATIN CALCIUM 80 MG/1
80 TABLET, FILM COATED ORAL AT BEDTIME
Refills: 0 | Status: DISCONTINUED | OUTPATIENT
Start: 2022-01-18 | End: 2022-01-18

## 2022-01-18 RX ORDER — HYDROCHLOROTHIAZIDE 25 MG
12.5 TABLET ORAL DAILY
Refills: 0 | Status: DISCONTINUED | OUTPATIENT
Start: 2022-01-18 | End: 2022-01-18

## 2022-01-18 RX ORDER — APIXABAN 2.5 MG/1
5 TABLET, FILM COATED ORAL EVERY 12 HOURS
Refills: 0 | Status: DISCONTINUED | OUTPATIENT
Start: 2022-01-18 | End: 2022-01-18

## 2022-01-18 RX ORDER — LOSARTAN POTASSIUM 100 MG/1
50 TABLET, FILM COATED ORAL DAILY
Refills: 0 | Status: DISCONTINUED | OUTPATIENT
Start: 2022-01-18 | End: 2022-01-18

## 2022-01-18 RX ORDER — FAMOTIDINE 10 MG/ML
40 INJECTION INTRAVENOUS DAILY
Refills: 0 | Status: DISCONTINUED | OUTPATIENT
Start: 2022-01-19 | End: 2022-01-18

## 2022-01-18 RX ORDER — METOPROLOL TARTRATE 50 MG
25 TABLET ORAL DAILY
Refills: 0 | Status: DISCONTINUED | OUTPATIENT
Start: 2022-01-18 | End: 2022-01-18

## 2022-01-18 RX ORDER — CLOPIDOGREL BISULFATE 75 MG/1
75 TABLET, FILM COATED ORAL DAILY
Refills: 0 | Status: DISCONTINUED | OUTPATIENT
Start: 2022-01-18 | End: 2022-01-18

## 2022-01-18 RX ORDER — FAMOTIDINE 10 MG/ML
1 INJECTION INTRAVENOUS
Qty: 30 | Refills: 0
Start: 2022-01-18 | End: 2022-02-16

## 2022-01-18 RX ADMIN — FAMOTIDINE 20 MILLIGRAM(S): 10 INJECTION INTRAVENOUS at 14:24

## 2022-01-18 RX ADMIN — APIXABAN 5 MILLIGRAM(S): 2.5 TABLET, FILM COATED ORAL at 14:20

## 2022-01-18 NOTE — CHART NOTE - NSCHARTNOTESELECT_GEN_ALL_CORE
Called to schedule referral for Radiation Oncology.  Spoke with pt and  and is agreeable with scheduling pt to see Dr. Sibley on Monday January 27.  Instructions given on where to go.  Pt complained of pain to the flank area where chest tubes where inserted. Encouraged to use ice to site prn.  Verbalized understanding.    Brief Procedure EP Note

## 2022-01-18 NOTE — H&P ADULT - ASSESSMENT
67 yo M with hx of HTN, HLD CAD and PAF admitted for EP study with possible SVT ablation, s/p aborted ablation 1/12/21 due to EKG changes during isopryl infusion  Presents for elective AF ablation    PAF  CAD  HTN  HLD    ablation procedure  bedrest post op per protocol  EKG in AM  Pepcid post op for 30 days  con't Eliquis  anticipated discharge in AM

## 2022-01-18 NOTE — DISCHARGE NOTE PROVIDER - HOSPITAL COURSE
66yMale with h/o Atrial fibrillation, underwent elective ablation. Now in NSR.  Procedure was uneventful. Patient tolerated procedure well.  Patient is stable for discharge

## 2022-01-18 NOTE — H&P ADULT - NSHPPHYSICALEXAM_GEN_ALL_CORE
GENERAL: In no apparent distress, well nourished, and hydrated.  HEAD:  Atraumatic, Normocephalic  EYES: EOMI, PERRLA, conjunctiva and sclera clear  NECK: Supple and normal thyroid.  No JVD or carotid bruit.  Carotid pulse is 2+ bilaterally.  HEART: Regular rate and rhythm; No murmurs, rubs, or gallops.  PULMONARY: Clear to auscultation and perfusion.  No rales, wheezing, or rhonchi bilaterally.  ABDOMEN: Soft, Nontender, Nondistended; Bowel sounds present  EXTREMITIES:  2+ Peripheral Pulses, No clubbing, cyanosis, or edema  NEUROLOGICAL: Grossly nonfocal

## 2022-01-18 NOTE — DISCHARGE NOTE PROVIDER - NSDCFUADDINST_GEN_ALL_CORE_FT
no heavy lifting or any strenuous activities for 10 days   Can take a shower, no bathtub, no submerging in water for 10 days  follow up with Dr Mojica 2/28 @8:30am  [facility] [facility] [address]  [phone]

## 2022-01-18 NOTE — DISCHARGE NOTE PROVIDER - NSDCFUSCHEDAPPT_GEN_ALL_CORE_FT
TORIBIO YANCEY ; 02/28/2022 ; [facility] [address] [facility] TORIBIO Mon ; 03/28/2022 ; [facility] [address] [facility] [address]

## 2022-01-18 NOTE — DISCHARGE NOTE NURSING/CASE MANAGEMENT/SOCIAL WORK - NSDCPEFALRISK_GEN_ALL_CORE
For information on Fall & Injury Prevention, visit: [email or web address] OR  [email or web address] OR  https://www.cdc.gov/steadi/patient.html

## 2022-01-18 NOTE — DISCHARGE NOTE PROVIDER - NSDCMRMEDTOKEN_GEN_ALL_CORE_FT
candesartan-hydrochlorothiazide 16 mg-12.5 mg oral tablet: 1 tab(s) orally once a day  clopidogrel 75 mg oral tablet: 1 tab(s) orally once a day  Eliquis 5 mg oral tablet: 1 tab(s) orally 2 times a day  Lipitor 40 mg oral tablet: 2 tab(s) orally once a day (at bedtime)  metoprolol succinate 25 mg oral tablet, extended release: 1 tab(s) orally once a day  Pepcid 40 mg oral tablet: 1 tab(s) orally once a day

## 2022-01-18 NOTE — CHART NOTE - NSCHARTNOTEFT_GEN_A_CORE
Electrophysiology Brief Post-Op Note      I have personally seen and examined the patient.  I agree with the history and physical which I have reviewed and noted any changes below.    PRE-OP DIAGNOSIS: Atrial Fibrillation    POST-OP DIAGNOSIS: Atrial Fibrillation    PROCEDURE:  -Transeptal Puncture  -3D Mapping  -Use of intracardiac echocardiography  -Pulmonary Veins Isolation (Cryoablation)  -RF ablation of CTI isthmus      Vascular Access used (using Ultrasound Guidance)  -Right Femoral Vein: 14F  -Left Femoral Vein: 11F 8F 6F  -Right Femoral Artery: none    All sheaths and wires removed and figure 8 suture applied in both groins.    Physician: Galina  Assistant: None    ANESTHESIA TYPE:  [X]General Anesthesia  [  ] Sedation  [  ] Local/Regional      CONDITION  [  ] Critical  [  ] Serious  [  ]Fair  [X]Good      SPECIMENS REMOVED (IF APPLICABLE): NONE      IMPLANTS (IF APPLICABLE): NONE      FINDINGS (see below)  -Successful Pulmonary Veins Isolation with entrance and exit block  -Successful RF ablation of CTI isthmus  -Perclose sututre deployed  -No pericardial effusion on ICE  -ESTIMATED BLOOD LOSS:  15 mL  -CONTRAST USED: 16 cc      PLAN OF CARE  -	Start Eliquis 5 mg q12 today (continuous schedule)  -	Start pepcid 20 mg daily tonight  -	Plan to discharge today

## 2022-01-18 NOTE — H&P ADULT - HISTORY OF PRESENT ILLNESS
67 yo M with hx of HTN, HLD CAD and PAF admitted for EP study with possible SVT ablation. Procedure complicated by ST changes consistent with ischemia during and after isopryl infusion. STs went down, patient take off the table and was referred for coronary angiography. Patient has a history of CAD with recent PCI in July 2021 with Dr. Arrieta. Patient taken to cath lab with nononbstructive CAD.    CATH  FINDINGS:   Coronary Dominance: right    LM: mild luminal irregularities    LAD: prox LAD 50% stenosis discrete, iFR 0.92   Diag: D1 60% stenosis iFR 0.94  D2 70% prox lesion iFR 0.93    CX: mild to moderate diffuse atherosclerotic disease   OM1: small vessel, mild disease  OM2: large sized, mild luminal irregularities    RCA: patent prox stent   RPDA: no disease

## 2022-01-18 NOTE — DISCHARGE NOTE NURSING/CASE MANAGEMENT/SOCIAL WORK - PATIENT PORTAL LINK FT
You can access the FollowMyHealth Patient Portal offered by [facility] by registering at the following website: [email or web address]. By joining [employer]’s FollowMyHealth portal, you will also be able to view your health information using other applications (apps) compatible with our system.

## 2022-01-18 NOTE — CHART NOTE - NSCHARTNOTEFT_GEN_A_CORE
PACU ANESTHESIA ADMISSION NOTE      Procedure:   Post op diagnosis:  catheter  ablation of AFIB    ____  Intubated  TV:______       Rate: ______      FiO2: ______    x____  Patent Airway    _x___  Full return of protective reflexes    _x___  Full recovery from anesthesia / back to baseline status    Vitals:  temp(F) 97  /68  spo2 100  RR 18  pulse 70    Mental Status: x  ____ Awake   _____ Alert   _____ Drowsy   _____ Sedated    Nausea/Vomiting:  __x __ NO  ______Yes,   See Post - Op Orders          Pain Scale (0-10):  _____    Treatment: ____ None   x  ____ See Post - Op/PCA Orders    Post - Operative Fluids:   ____ Oral   _x ___ See Post - Op Orders    Plan: Discharge:   ____Home       ___x __Floor     _____Critical Care    _____  Other:_________________    Comments: uneventful anesthesia course no complications. Vitals stable. Pt transferred to PACU

## 2022-01-18 NOTE — DISCHARGE NOTE PROVIDER - CARE PROVIDER_API CALL
Alex Mojica)  Cardiac Electrophysiology; Cardiovascular Disease; [facility]  [address] [place] [address]  Phone: [phone]  Fax: [phone]  Scheduled Appointment: 02/28/2022 08:30 AM

## 2022-01-19 DIAGNOSIS — I49.8 OTHER SPECIFIED CARDIAC ARRHYTHMIAS: ICD-10-CM

## 2022-01-19 DIAGNOSIS — I47.1 SUPRAVENTRICULAR TACHYCARDIA: ICD-10-CM

## 2022-01-19 DIAGNOSIS — Z95.5 PRESENCE OF CORONARY ANGIOPLASTY IMPLANT AND GRAFT: ICD-10-CM

## 2022-01-19 DIAGNOSIS — K00.0 ANODONTIA: ICD-10-CM

## 2022-01-19 DIAGNOSIS — Z79.02 LONG TERM (CURRENT) USE OF ANTITHROMBOTICS/ANTIPLATELETS: ICD-10-CM

## 2022-01-19 DIAGNOSIS — I10 ESSENTIAL (PRIMARY) HYPERTENSION: ICD-10-CM

## 2022-01-19 DIAGNOSIS — Z79.01 LONG TERM (CURRENT) USE OF ANTICOAGULANTS: ICD-10-CM

## 2022-01-19 DIAGNOSIS — Z72.89 OTHER PROBLEMS RELATED TO LIFESTYLE: ICD-10-CM

## 2022-01-19 DIAGNOSIS — I48.92 UNSPECIFIED ATRIAL FLUTTER: ICD-10-CM

## 2022-01-19 DIAGNOSIS — Z96.5 PRESENCE OF TOOTH-ROOT AND MANDIBULAR IMPLANTS: ICD-10-CM

## 2022-01-19 DIAGNOSIS — I24.9 ACUTE ISCHEMIC HEART DISEASE, UNSPECIFIED: ICD-10-CM

## 2022-01-19 DIAGNOSIS — I25.10 ATHEROSCLEROTIC HEART DISEASE OF NATIVE CORONARY ARTERY WITHOUT ANGINA PECTORIS: ICD-10-CM

## 2022-01-19 DIAGNOSIS — T50.995A ADVERSE EFFECT OF OTHER DRUGS, MEDICAMENTS AND BIOLOGICAL SUBSTANCES, INITIAL ENCOUNTER: ICD-10-CM

## 2022-01-19 DIAGNOSIS — E78.5 HYPERLIPIDEMIA, UNSPECIFIED: ICD-10-CM

## 2022-01-19 DIAGNOSIS — Z87.891 PERSONAL HISTORY OF NICOTINE DEPENDENCE: ICD-10-CM

## 2022-01-19 DIAGNOSIS — Z98.52 VASECTOMY STATUS: ICD-10-CM

## 2022-02-28 ENCOUNTER — APPOINTMENT (OUTPATIENT)
Dept: CARDIOLOGY | Facility: CLINIC | Age: 67
End: 2022-02-28
Payer: MEDICARE

## 2022-02-28 VITALS
HEIGHT: 69 IN | HEART RATE: 65 BPM | TEMPERATURE: 97.3 F | BODY MASS INDEX: 27.55 KG/M2 | WEIGHT: 186 LBS | SYSTOLIC BLOOD PRESSURE: 119 MMHG | DIASTOLIC BLOOD PRESSURE: 78 MMHG

## 2022-02-28 PROCEDURE — 93000 ELECTROCARDIOGRAM COMPLETE: CPT

## 2022-02-28 PROCEDURE — 99214 OFFICE O/P EST MOD 30 MIN: CPT

## 2022-02-28 RX ORDER — CLOPIDOGREL BISULFATE 75 MG/1
75 TABLET, FILM COATED ORAL DAILY
Qty: 90 | Refills: 3 | Status: COMPLETED | COMMUNITY
End: 2022-02-28

## 2022-02-28 NOTE — CARDIOLOGY SUMMARY
[de-identified] : (02/28/2022) / ECG: sinus bradycardia at 65 bpm, no significant ST/T abnormalities\par (11/29/2021) / ECG: sinus bradycardia at 58 bpm, no significant ST/T abnormalities [de-identified] : (10/02/2021) / TTE: EF 62%, mild AI, moderate MR [de-identified] : (1/18/2022) / AF ablation: Cryoballoon PVI + RF CTI isthmus [de-identified] : (7/7/2021) / Cath PCI report: LAD 50%, Cx 50%, mid RCA 80%, 4.5x 16 mm synergy stent

## 2022-02-28 NOTE — HISTORY OF PRESENT ILLNESS
[FreeTextEntry1] : Referring Cardiologist: Dr. Emily Arrieta\par Referring Internist: Dr. Reymundo Singh\par ([facility] [address], [place] [address])\par \par CAD s/p stent in July 2021; hypertension, hyperlipidemia\par Atrial Fibrillation 10/28/2021 while in [place]\par He woke up with palpitations and SOB; called ambulance; started on Amiodarone and Eliquis;\par EMS original assessment was SVT; on arrival to ER he was in AF.\par He is physically active: biking outside; treadmill; light weights\par 1.5 hours 4 days a week\par He underwent on 1/18/2022: Cyo-PVI _ RF CTI\par mild palpitations at night occasional brief; \par He has no chest pain, no shortness of breath, no dyspnea on exertion, no orthopnea, no PND. He denies dizziness, lightheadedness and syncope. He has no exertional symptoms. He presents for evaluation.\par \par

## 2022-02-28 NOTE — DISCUSSION/SUMMARY
[FreeTextEntry1] : Mr. Russ Schultz is a pleasant 66 year-old man with CAD s/p stent in July 2021, hypertension, hyperlipidemia, and cardiac arrhythmias. He was diagnosed with Atrial Fibrillation on 10/28/2021 while in [place]. He woke up with palpitations and SOB; called ambulance; give Amiodarone and Eliquis;\par EMS original assessment was SVT; on arrival to ER he was in AF. Review of ECGs shows Atrial Flutter/Atrial Fibrillation. He is physically active: biking outside; treadmill; light weights -1.5 hours 4 days a week- His CHADSVASC score is 3 (Age, CAD, HTN). He is on Eliquis. \par \par I recommend to continue Eliquis, and Metoprolol. \par \par Use of betablocker/CCB/antiarrhythmics limited by resting bradycardia. \par \par I discussed hydration and avoiding ETOH.\par \par I recommend evaluation for SOMMER.\par \par HE went for EP study on 1/12/2022 and he had no inducible SVT. During EP study he had ST depression, leading to stopping procedure and going for emergent cath. Cardiac cath showed patent stents. He underwent Cryoballoon ablation PVI + RF CTI on 1/18/2022; \par \par Patient has no arrhythmias since the catheter ablation. There are no groin hematomas or bleeding. Patient is pleased with results of catheter ablation although is aware with the risk of recurrent symptoms and need for another ablation. Post ablation care was discussed in great length. I discussed with patient contacting me in case of any symptoms suggestive of recurrence.\par \par I discussed with patient plan of care in great details. I answered all his questions to his satisfaction. Patient was pleased with the visit.\par \par Patient will follow with me in 6 months’ time. Please do not hesitate to contact me at [phone] if you have any further questions regarding this patient care.

## 2022-03-28 ENCOUNTER — APPOINTMENT (OUTPATIENT)
Dept: CARDIOLOGY | Facility: CLINIC | Age: 67
End: 2022-03-28

## 2022-04-20 NOTE — H&P PST ADULT - NS PRO PASSIVE SMOKE EXP
Have Your Skin Lesions Been Treated?: not been treated Is This A New Presentation, Or A Follow-Up?: Skin Lesions No

## 2022-10-03 ENCOUNTER — APPOINTMENT (OUTPATIENT)
Dept: CARDIOLOGY | Facility: CLINIC | Age: 67
End: 2022-10-03

## 2022-10-03 VITALS
SYSTOLIC BLOOD PRESSURE: 130 MMHG | BODY MASS INDEX: 27.7 KG/M2 | TEMPERATURE: 97.1 F | RESPIRATION RATE: 16 BRPM | HEIGHT: 69 IN | DIASTOLIC BLOOD PRESSURE: 90 MMHG | HEART RATE: 64 BPM | WEIGHT: 187 LBS

## 2022-10-03 PROCEDURE — 99214 OFFICE O/P EST MOD 30 MIN: CPT

## 2022-10-03 PROCEDURE — 93000 ELECTROCARDIOGRAM COMPLETE: CPT

## 2022-10-03 RX ORDER — METOPROLOL SUCCINATE 25 MG/1
25 TABLET, EXTENDED RELEASE ORAL
Refills: 0 | Status: COMPLETED | COMMUNITY
Start: 2021-09-14 | End: 2022-10-03

## 2022-10-03 NOTE — CARDIOLOGY SUMMARY
[de-identified] : (10/03/2022) / ECG: sinus bradycardia at 64 bpm, no significant ST/T abnormalities\par (02/28/2022) / ECG: sinus bradycardia at 65 bpm, no significant ST/T abnormalities\par (11/29/2021) / ECG: sinus bradycardia at 58 bpm, no significant ST/T abnormalities [de-identified] : (10/02/2021) / TTE: EF 62%, mild AI, moderate MR [de-identified] : (1/18/2022) / AF ablation: Cryoballoon PVI + RF CTI isthmus [de-identified] : (7/7/2021) / Cath PCI report: LAD 50%, Cx 50%, mid RCA 80%, 4.5x 16 mm synergy stent

## 2022-10-03 NOTE — HISTORY OF PRESENT ILLNESS
[FreeTextEntry1] : Referring Cardiologist: Dr. Emily Arrieta\par Referring Internist: Dr. Reymundo Singh\par ([facility], [place] [address])\par \par CAD s/p stent in July 2021; hypertension, hyperlipidemia\par Atrial Fibrillation 10/28/2021 while in [place]\par He woke up with palpitations and SOB; called ambulance; started on Amiodarone and Eliquis;\par EMS original assessment was SVT; on arrival to ER he was in AF.\par He is physically active: biking outside; treadmill; light weights\par 1.5 hours 4 days a week\par He underwent on 1/18/2022: Cyo-PVI _ RF CTI\par mild palpitations occasional lasting few seconds; longest 19 seconds\par He has no chest pain, no shortness of breath, no dyspnea on exertion, no orthopnea, no PND. He denies dizziness, lightheadedness and syncope. He has no exertional symptoms. He presents for evaluation.\par \par

## 2023-02-08 NOTE — H&P PST ADULT - PAIN SCALE PREFERRED, PROFILE
Per Dr. Ahmadi, stick to just the gentlease formula for the next week or so. Give about 20 ounces daily. Give the \"P\" fruits and high fiber foods.   Call back if he continues to be constipated over the next few days or sooner if he develops any new symptoms.   Mother states understanding and comfortable with plan.    none

## 2023-04-07 ENCOUNTER — APPOINTMENT (OUTPATIENT)
Dept: ELECTROPHYSIOLOGY | Facility: CLINIC | Age: 68
End: 2023-04-07
Payer: MEDICARE

## 2023-04-07 VITALS
HEIGHT: 69 IN | TEMPERATURE: 97.1 F | HEART RATE: 58 BPM | WEIGHT: 189 LBS | RESPIRATION RATE: 16 BRPM | SYSTOLIC BLOOD PRESSURE: 140 MMHG | BODY MASS INDEX: 27.99 KG/M2 | DIASTOLIC BLOOD PRESSURE: 90 MMHG

## 2023-04-07 PROCEDURE — 99214 OFFICE O/P EST MOD 30 MIN: CPT

## 2023-04-07 PROCEDURE — 93000 ELECTROCARDIOGRAM COMPLETE: CPT

## 2023-04-07 RX ORDER — CANDESARTAN CILEXETIL AND HYDROCHLOROTHIAZIDE 16; 12.5 MG/1; MG/1
16-12.5 TABLET ORAL DAILY
Refills: 0 | Status: COMPLETED | COMMUNITY
End: 2023-04-07

## 2023-04-07 RX ORDER — METOPROLOL SUCCINATE 50 MG/1
50 TABLET, EXTENDED RELEASE ORAL DAILY
Refills: 0 | Status: COMPLETED | COMMUNITY
End: 2023-04-07

## 2023-05-06 NOTE — ADDENDUM
[FreeTextEntry1] : Alexa MARTINEZ assisted in documentation on 04/10/2023   acting as a scribe for Dr. Ollie Hopkins.\par .\par

## 2023-05-06 NOTE — CARDIOLOGY SUMMARY
[de-identified] : (10/03/2022) / ECG: sinus bradycardia at 64 bpm, no significant ST/T abnormalities\par (02/28/2022) / ECG: sinus bradycardia at 65 bpm, no significant ST/T abnormalities\par (11/29/2021) / ECG: sinus bradycardia at 58 bpm, no significant ST/T abnormalities [de-identified] : (1/18/2022) / AF ablation: Cryoballoon PVI + RF CTI isthmus [de-identified] : (10/02/2021) / TTE: EF 62%, mild AI, moderate MR [de-identified] : (7/7/2021) / Cath PCI report: LAD 50%, Cx 50%, mid RCA 80%, 4.5x 16 mm synergy stent

## 2023-05-06 NOTE — HISTORY OF PRESENT ILLNESS
[FreeTextEntry1] : Referring Cardiologist: Dr. Emily Arrieta\par Referring Internist: Dr. Reymundo Singh\par ([facility] [address], [place] [address])\par \par CAD s/p stent in July 2021; hypertension, hyperlipidemia\par Atrial Fibrillation 10/28/2021 while in [place]\par He woke up with palpitations and SOB; called ambulance; started on Amiodarone and Eliquis;\par EMS original assessment was SVT; on arrival to ER he was in AF.\par He is physically active: biking outside; treadmill; light weights\par 1.5 hours 4 days a week\par He underwent on 1/18/2022: Cyo-PVI _ RF CTI\par \par Patient comes to the office for further evaluation of new onset atrial fibrillation. Patient was in [place] and had one episode of atrial flutter. EKG from [place] is consistent with atypical 2:1 atrial flutter. During hospitalization, patient was also found to have positive Troponin, mostly likely due to supply-demand mismatch. Pervious cardiac catheterization showed borderline lesions which were evaluated with IFR and shown to be not significant. \par \par \par \par \par EKG (04/07/2023): Sinus rhythm at 58 bpm

## 2023-05-06 NOTE — ASSESSMENT
[FreeTextEntry1] : Atrial Fibrillation / Atrial Flutter \par - Patient had two episodes of atrial fibrillation and atrial flutter since his ablation last year. The last episode occurred in [place] and was associated with positive Troponin. \par - At this time, recommend patient undergo cardiac catheterization to further evaluate previous coronary artery stenosis. \par - Continue Eliquis 5 mg q12. \par - Continue Diltiazem 120 mg once a day. \par - Patient will return to see Dr. Mojica for discussion of further treatment after cardiac catheterization is completed. \par \par Hypertension \par - Patient's pressure is well-controlled. \par - Continue Lisinopril 20 mg once a day.

## 2023-05-22 ENCOUNTER — APPOINTMENT (OUTPATIENT)
Dept: ELECTROPHYSIOLOGY | Facility: CLINIC | Age: 68
End: 2023-05-22

## 2023-06-06 ENCOUNTER — NON-APPOINTMENT (OUTPATIENT)
Age: 68
End: 2023-06-06

## 2023-07-27 ENCOUNTER — NON-APPOINTMENT (OUTPATIENT)
Age: 68
End: 2023-07-27

## 2023-08-11 NOTE — PRE-ANESTHESIA EVALUATION ADULT - NSANTHTOTALSCORECAL_ENT_A_CORE
----- Message from Millie Arredondo sent at 8/11/2023 12:40 PM EDT -----  Subject: Refill Request    QUESTIONS  Name of Medication? acetaminophen (TYLENOL) 500 MG tablet  Rx sent to pharmacy 8/4/23  Patient-reported dosage and instructions? 500 mg, Take 1 tablet by mouth 2   times daily as needed for Pain MAX 1000mg (one gram) per day. How many days do you have left? 0  Preferred Pharmacy? [facility]  Pharmacy phone number (if available)? [phone]  ---------------------------------------------------------------------------  --------------,  Name of Medication? insulin lispro (HUMALOG) 100 UNIT/ML injection   cartridge  Patient-reported dosage and instructions? Temp fill while pump is on   order. USE WITH MEALS AS DIRECTED PER SLIDING SCALE (MAX 12 UNITS WITH   MEAL) per endocrinology's sliding scale. Max 40 units per day. MUST EAT   MEALS IF TAKING. How many days do you have left? 0  Preferred Pharmacy? [facility]  Pharmacy phone number (if available)? [phone]      Name of Medication? gabapentin (NEURONTIN) 400 MG capsule  Rx sent 7/18/23 for #90 w/3 refills  Patient-reported dosage and instructions? 2 caps, twice daily   How many days do you have left? 0   Preferred Pharmacy? [address] [place] [address]   Pharmacy phone number (if available)? [phone]   ---------------------------------------------------------------------------   --------------   CALL BACK INFO   What is the best way for the office to contact you? OK to leave message on   voicemail   Preferred Call Back Phone Number? [phone]   ---------------------------------------------------------------------------   --------------   SCRIPT ANSWERS   Relationship to Patient? Other/Third Party   Representative Name? Faisal   Is the representative on the Communication Release of Information (JENI)   form in Epic?  Yes   ---------------------------------------------------------------------------  --------------  CALL BACK INFO  What is the best way 3

## 2023-08-28 ENCOUNTER — APPOINTMENT (OUTPATIENT)
Dept: ELECTROPHYSIOLOGY | Facility: CLINIC | Age: 68
End: 2023-08-28

## 2023-09-26 ENCOUNTER — APPOINTMENT (OUTPATIENT)
Dept: ELECTROPHYSIOLOGY | Facility: CLINIC | Age: 68
End: 2023-09-26
Payer: MEDICARE

## 2023-09-26 VITALS
SYSTOLIC BLOOD PRESSURE: 126 MMHG | WEIGHT: 187 LBS | TEMPERATURE: 98 F | BODY MASS INDEX: 27.7 KG/M2 | HEIGHT: 69 IN | DIASTOLIC BLOOD PRESSURE: 80 MMHG | HEART RATE: 55 BPM

## 2023-09-26 DIAGNOSIS — E78.00 PURE HYPERCHOLESTEROLEMIA, UNSPECIFIED: ICD-10-CM

## 2023-09-26 DIAGNOSIS — I25.10 ATHEROSCLEROTIC HEART DISEASE OF NATIVE CORONARY ARTERY W/OUT ANGINA PECTORIS: ICD-10-CM

## 2023-09-26 DIAGNOSIS — Z98.61 ATHEROSCLEROTIC HEART DISEASE OF NATIVE CORONARY ARTERY W/OUT ANGINA PECTORIS: ICD-10-CM

## 2023-09-26 PROCEDURE — 93000 ELECTROCARDIOGRAM COMPLETE: CPT

## 2023-09-26 PROCEDURE — 99214 OFFICE O/P EST MOD 30 MIN: CPT

## 2023-10-24 NOTE — PATIENT PROFILE ADULT - BRAND OF COVID-19 VACCINATION
Addended by: RERE SILVA on: 10/24/2023 11:48 AM     Modules accepted: Orders     Moderna dose 1 and 2

## 2023-11-08 ENCOUNTER — NON-APPOINTMENT (OUTPATIENT)
Age: 68
End: 2023-11-08

## 2024-05-20 ENCOUNTER — APPOINTMENT (OUTPATIENT)
Dept: ELECTROPHYSIOLOGY | Facility: CLINIC | Age: 69
End: 2024-05-20
Payer: MEDICARE

## 2024-05-20 VITALS
HEIGHT: 69 IN | HEART RATE: 67 BPM | DIASTOLIC BLOOD PRESSURE: 72 MMHG | SYSTOLIC BLOOD PRESSURE: 118 MMHG | BODY MASS INDEX: 26.66 KG/M2 | WEIGHT: 180 LBS | TEMPERATURE: 97.1 F

## 2024-05-20 DIAGNOSIS — I49.8 OTHER SPECIFIED CARDIAC ARRHYTHMIAS: ICD-10-CM

## 2024-05-20 DIAGNOSIS — I48.0 PAROXYSMAL ATRIAL FIBRILLATION: ICD-10-CM

## 2024-05-20 DIAGNOSIS — I48.92 UNSPECIFIED ATRIAL FLUTTER: ICD-10-CM

## 2024-05-20 DIAGNOSIS — I10 ESSENTIAL (PRIMARY) HYPERTENSION: ICD-10-CM

## 2024-05-20 PROCEDURE — 99214 OFFICE O/P EST MOD 30 MIN: CPT

## 2024-05-20 PROCEDURE — 93000 ELECTROCARDIOGRAM COMPLETE: CPT

## 2024-05-20 RX ORDER — ATORVASTATIN CALCIUM 40 MG/1
40 TABLET, FILM COATED ORAL
Qty: 90 | Refills: 3 | Status: COMPLETED | COMMUNITY
End: 2024-05-20

## 2024-05-20 RX ORDER — DILTIAZEM HYDROCHLORIDE 120 MG/1
120 CAPSULE, EXTENDED RELEASE ORAL
Qty: 180 | Refills: 3 | Status: ACTIVE | COMMUNITY

## 2024-05-20 RX ORDER — CANDESARTAN CILEXETIL AND HYDROCHLOROTHIAZIDE 16; 12.5 MG/1; MG/1
16-12.5 TABLET ORAL
Refills: 0 | Status: ACTIVE | COMMUNITY

## 2024-05-20 RX ORDER — ROSUVASTATIN CALCIUM 20 MG/1
20 TABLET, FILM COATED ORAL DAILY
Refills: 0 | Status: ACTIVE | COMMUNITY

## 2024-05-20 RX ORDER — CLOPIDOGREL BISULFATE 75 MG/1
75 TABLET, FILM COATED ORAL
Refills: 0 | Status: ACTIVE | COMMUNITY

## 2024-05-20 RX ORDER — APIXABAN 5 MG/1
5 TABLET, FILM COATED ORAL
Qty: 180 | Refills: 3 | Status: ACTIVE | COMMUNITY

## 2024-05-20 RX ORDER — LISINOPRIL 40 MG/1
40 TABLET ORAL DAILY
Qty: 90 | Refills: 2 | Status: COMPLETED | COMMUNITY
End: 2024-05-20

## 2024-05-20 NOTE — DISCUSSION/SUMMARY
[FreeTextEntry1] : Mr. Russ Schultz is a pleasant 68-year-old man with CAD s/p stent in July 2021 and May 2023, hypertension, hyperlipidemia, and cardiac arrhythmias. He was diagnosed with Atrial Fibrillation on 10/28/2021 while in [place]. He woke up with palpitations and SOB; called ambulance; given Amiodarone and Eliquis; EMS original assessment was SVT; on arrival to ER he was in AF. Review of ECGs shows Atrial Flutter/Atrial Fibrillation. He is physically active: biking outside; treadmill; light weights -1.5 hours 4 days a week- His CHADSVASC score is 3 (Age, CAD, HTN). He is on Eliquis.   He went for EP study on 1/12/2022 and he had no inducible SVT. During EP study he had ST depression, leading to stopping procedure and going for emergent cath. Cardiac cath showed patent stents. He underwent Cryoballoon ablation PVI + RF CTI on 1/18/2022;  He had AF for few hours in [place] after work-out and dehydration.  I discussed hydration and avoiding ETOH.  Patient will check with pharmacy re copay on Xarelto vs. Eliquis.  I recommend evaluation for SOMMER.  I will consider him for redo ablation in case of recurrence of AF.  In case of dental work, he can stop Eliquis 24-48 hours prior to dental procedure and restart it after 24 hours.   I discussed with patient plan of care in great details. I answered all his questions to his satisfaction. Patient was pleased with the visit.  Patient will follow with me in 12 months' time. Please do not hesitate to contact me at [phone] if you have any further questions regarding this patient care. [EKG obtained to assist in diagnosis and management of assessed problem(s)] : EKG obtained to assist in diagnosis and management of assessed problem(s)

## 2024-05-20 NOTE — HISTORY OF PRESENT ILLNESS
The ECG revealed a sinus rhythm. [FreeTextEntry1] : Referring Cardiologist: Dr. Emily Arrieta Referring Internist: Dr. Reymundo Singh ([facility], [place] [address])  CAD s/p stent in July 2021; hypertension, hyperlipidemia Atrial Fibrillation 10/28/2021 while in [place] He woke up with palpitations and SOB; called ambulance; started on Amiodarone and Eliquis; EMS original assessment was SVT; on arrival to ER he was in AF. He is physically active: biking outside; treadmill; light weights 1.5 hours 4 days a week He underwent on 1/18/2022: Cyo-PVI _ RF CTI  Patient comes to the office for further evaluation of new onset atrial fibrillation. Patient was in [place] and had one episode of atrial flutter. EKG from [place] is consistent with atypical 2:1 atrial flutter. During hospitalization, patient was also found to have positive Troponin, mostly likely due to supply-demand mismatch.   April 2023: episode of AF in [place] after work-out and dehydration - terminated with Diltiazem IV  9/26/2023: No recurrent AF.  5/20/2024: no recurrent AF. He has no chest pain, no shortness of breath, no dyspnea on exertion, no orthopnea, no PND. He denies dizziness, lightheadedness and syncope. He has no exertional symptoms. He presents for evaluation.

## 2024-05-20 NOTE — CARDIOLOGY SUMMARY
[de-identified] : (5/20/2024) sinus rhythm at 67 bpm, no significant ST/T abnormalities (9/26/2023) sinus rhythm at 55 bpm, no significant ST/T abnormalities (04/07/2023): Sinus rhythm at 58 bpm (10/03/2022) / ECG: sinus bradycardia at 64 bpm, no significant ST/T abnormalities (02/28/2022) / ECG: sinus bradycardia at 65 bpm, no significant ST/T abnormalities (11/29/2021) / ECG: sinus bradycardia at 58 bpm, no significant ST/T abnormalities [de-identified] : (10/02/2021) / TTE: EF 62%, mild AI, moderate MR [de-identified] : (1/18/2022) / AF ablation: Cryoballoon PVI + RF CTI isthmus [de-identified] : (May 2023) / PCI with Dr Arrieta at [facility] (7/7/2021) / Cath PCI report: LAD 50%, Cx 50%, mid RCA 80%, 4.5x 16 mm synergy stent

## 2024-09-16 NOTE — PRE-ANESTHESIA EVALUATION ADULT - NSANTHDISPORD_GEN_ALL_CORE
[facility] - CHRISTINA Dodge / CHRISTINA Bustillo / DEMETRIUS Vásquez/ CHRISTINA Mortensen/ CHRISTINA Stubbs/ PATRICIO Garcia / MORRIS Galeana / GAY Borrego / LYNETTE Magallon  ---------------------------------------------------------------------------------------------------------------  PACU

## 2024-10-15 RX ORDER — DILTIAZEM HYDROCHLORIDE 120 MG/1
120 TABLET ORAL
Qty: 180 | Refills: 0 | Status: ACTIVE | COMMUNITY
Start: 2024-10-15 | End: 1900-01-01

## 2024-10-15 RX ORDER — DILTIAZEM HYDROCHLORIDE 120 MG/1
120 TABLET, EXTENDED RELEASE ORAL
Qty: 180 | Refills: 3 | Status: DISCONTINUED | COMMUNITY
Start: 2024-10-14 | End: 2024-10-15

## 2025-06-05 RX ORDER — DABIGATRAN ETEXILATE 150 MG/1
150 CAPSULE ORAL TWICE DAILY
Qty: 180 | Refills: 1 | Status: ACTIVE | COMMUNITY
Start: 2025-06-05 | End: 1900-01-01

## 2025-08-25 ENCOUNTER — APPOINTMENT (OUTPATIENT)
Dept: ELECTROPHYSIOLOGY | Facility: CLINIC | Age: 70
End: 2025-08-25
Payer: MEDICARE

## 2025-08-25 VITALS
HEART RATE: 58 BPM | DIASTOLIC BLOOD PRESSURE: 80 MMHG | BODY MASS INDEX: 27.25 KG/M2 | HEIGHT: 69 IN | WEIGHT: 184 LBS | SYSTOLIC BLOOD PRESSURE: 136 MMHG

## 2025-08-25 DIAGNOSIS — I49.8 OTHER SPECIFIED CARDIAC ARRHYTHMIAS: ICD-10-CM

## 2025-08-25 DIAGNOSIS — I10 ESSENTIAL (PRIMARY) HYPERTENSION: ICD-10-CM

## 2025-08-25 DIAGNOSIS — I25.10 ATHEROSCLEROTIC HEART DISEASE OF NATIVE CORONARY ARTERY W/OUT ANGINA PECTORIS: ICD-10-CM

## 2025-08-25 DIAGNOSIS — Z78.9 OTHER SPECIFIED HEALTH STATUS: ICD-10-CM

## 2025-08-25 DIAGNOSIS — I48.0 PAROXYSMAL ATRIAL FIBRILLATION: ICD-10-CM

## 2025-08-25 DIAGNOSIS — E78.00 PURE HYPERCHOLESTEROLEMIA, UNSPECIFIED: ICD-10-CM

## 2025-08-25 DIAGNOSIS — Z98.61 ATHEROSCLEROTIC HEART DISEASE OF NATIVE CORONARY ARTERY W/OUT ANGINA PECTORIS: ICD-10-CM

## 2025-08-25 DIAGNOSIS — I48.92 UNSPECIFIED ATRIAL FLUTTER: ICD-10-CM

## 2025-08-25 PROCEDURE — 99215 OFFICE O/P EST HI 40 MIN: CPT

## 2025-08-25 PROCEDURE — 93000 ELECTROCARDIOGRAM COMPLETE: CPT
